# Patient Record
Sex: FEMALE | Race: WHITE | NOT HISPANIC OR LATINO | Employment: FULL TIME | URBAN - METROPOLITAN AREA
[De-identification: names, ages, dates, MRNs, and addresses within clinical notes are randomized per-mention and may not be internally consistent; named-entity substitution may affect disease eponyms.]

---

## 2019-02-19 ENCOUNTER — APPOINTMENT (OUTPATIENT)
Dept: PREADMISSION TESTING | Facility: HOSPITAL | Age: 49
End: 2019-02-19

## 2019-02-19 ENCOUNTER — HOSPITAL ENCOUNTER (OUTPATIENT)
Dept: GENERAL RADIOLOGY | Facility: HOSPITAL | Age: 49
Discharge: HOME | End: 2019-02-19
Admitting: ORTHOPAEDIC SURGERY

## 2019-02-19 VITALS
WEIGHT: 292.4 LBS | HEART RATE: 94 BPM | TEMPERATURE: 97.3 F | OXYGEN SATURATION: 97 % | BODY MASS INDEX: 39.6 KG/M2 | DIASTOLIC BLOOD PRESSURE: 90 MMHG | RESPIRATION RATE: 16 BRPM | HEIGHT: 72 IN | SYSTOLIC BLOOD PRESSURE: 142 MMHG

## 2019-02-19 DIAGNOSIS — M17.9 OSTEOARTHRITIS OF KNEE, UNSPECIFIED LATERALITY, UNSPECIFIED OSTEOARTHRITIS TYPE: ICD-10-CM

## 2019-02-19 LAB
ABO GROUP BLD: NORMAL
ANION GAP SERPL CALCULATED.3IONS-SCNC: 10.5 MMOL/L
BILIRUB UR QL STRIP: NEGATIVE
BLD GP AB SCN SERPL QL: NEGATIVE
BUN BLD-MCNC: 11 MG/DL (ref 6–20)
BUN/CREAT SERPL: 17.5 (ref 7–25)
CALCIUM SPEC-SCNC: 8.7 MG/DL (ref 8.6–10.5)
CHLORIDE SERPL-SCNC: 105 MMOL/L (ref 98–107)
CLARITY UR: CLEAR
CO2 SERPL-SCNC: 23.5 MMOL/L (ref 22–29)
COLOR UR: YELLOW
CREAT BLD-MCNC: 0.63 MG/DL (ref 0.57–1)
DEPRECATED RDW RBC AUTO: 44.6 FL (ref 37–54)
ERYTHROCYTE [DISTWIDTH] IN BLOOD BY AUTOMATED COUNT: 13.6 % (ref 12.3–15.4)
GFR SERPL CREATININE-BSD FRML MDRD: 101 ML/MIN/1.73
GLUCOSE BLD-MCNC: 93 MG/DL (ref 65–99)
GLUCOSE UR STRIP-MCNC: NEGATIVE MG/DL
HCT VFR BLD AUTO: 42.2 % (ref 34–46.6)
HGB BLD-MCNC: 14 G/DL (ref 12–15.9)
HGB UR QL STRIP.AUTO: NEGATIVE
INR PPP: 1.02 (ref 0.9–1.1)
KETONES UR QL STRIP: ABNORMAL
LEUKOCYTE ESTERASE UR QL STRIP.AUTO: NEGATIVE
MCH RBC QN AUTO: 29.7 PG (ref 26.6–33)
MCHC RBC AUTO-ENTMCNC: 33.2 G/DL (ref 31.5–35.7)
MCV RBC AUTO: 89.4 FL (ref 79–97)
NITRITE UR QL STRIP: NEGATIVE
PH UR STRIP.AUTO: 7 [PH] (ref 5–8)
PLATELET # BLD AUTO: 315 10*3/MM3 (ref 140–450)
PMV BLD AUTO: 10 FL (ref 6–12)
POTASSIUM BLD-SCNC: 4 MMOL/L (ref 3.5–5.2)
PROT UR QL STRIP: NEGATIVE
PROTHROMBIN TIME: 13.2 SECONDS (ref 11.7–14.2)
RBC # BLD AUTO: 4.72 10*6/MM3 (ref 3.77–5.28)
RH BLD: NEGATIVE
SODIUM BLD-SCNC: 139 MMOL/L (ref 136–145)
SP GR UR STRIP: 1.02 (ref 1–1.03)
T&S EXPIRATION DATE: NORMAL
UROBILINOGEN UR QL STRIP: ABNORMAL
WBC NRBC COR # BLD: 9.25 10*3/MM3 (ref 3.4–10.8)

## 2019-02-19 PROCEDURE — 85610 PROTHROMBIN TIME: CPT | Performed by: ORTHOPAEDIC SURGERY

## 2019-02-19 PROCEDURE — 85027 COMPLETE CBC AUTOMATED: CPT | Performed by: ORTHOPAEDIC SURGERY

## 2019-02-19 PROCEDURE — 36415 COLL VENOUS BLD VENIPUNCTURE: CPT

## 2019-02-19 PROCEDURE — 86850 RBC ANTIBODY SCREEN: CPT | Performed by: ORTHOPAEDIC SURGERY

## 2019-02-19 PROCEDURE — 93010 ELECTROCARDIOGRAM REPORT: CPT | Performed by: INTERNAL MEDICINE

## 2019-02-19 PROCEDURE — 93005 ELECTROCARDIOGRAM TRACING: CPT

## 2019-02-19 PROCEDURE — 80048 BASIC METABOLIC PNL TOTAL CA: CPT | Performed by: ORTHOPAEDIC SURGERY

## 2019-02-19 PROCEDURE — 71046 X-RAY EXAM CHEST 2 VIEWS: CPT

## 2019-02-19 PROCEDURE — 86901 BLOOD TYPING SEROLOGIC RH(D): CPT | Performed by: ORTHOPAEDIC SURGERY

## 2019-02-19 PROCEDURE — 86900 BLOOD TYPING SEROLOGIC ABO: CPT | Performed by: ORTHOPAEDIC SURGERY

## 2019-02-19 PROCEDURE — 81003 URINALYSIS AUTO W/O SCOPE: CPT | Performed by: ORTHOPAEDIC SURGERY

## 2019-02-19 RX ORDER — CHLORHEXIDINE GLUCONATE 500 MG/1
1 CLOTH TOPICAL 2 TIMES DAILY
COMMUNITY
Start: 2019-03-04 | End: 2019-03-06 | Stop reason: HOSPADM

## 2019-02-19 RX ORDER — CETIRIZINE HYDROCHLORIDE 10 MG/1
10 TABLET ORAL DAILY PRN
COMMUNITY

## 2019-02-19 ASSESSMENT — KOOS JR
KOOS JR SCORE: 44.905
KOOS JR SCORE: 17

## 2019-02-19 NOTE — DISCHARGE INSTRUCTIONS
Take the following medications the morning of surgery with a small sip of water:  NONE    ARRIVE AT 0530.        General Instructions:  • Do not eat solid food after midnight the night before surgery.  • You may drink clear liquids day of surgery but must stop at least one hour before your hospital arrival time.  • It is beneficial for you to have a clear drink that contains carbohydrates the day of surgery.  We suggest a 12 to 20 ounce bottle of Gatorade or Powerade for non-diabetic patients or a 12 to 20 ounce bottle of G2 or Powerade Zero for diabetic patients. (Pediatric patients, are not advised to drink a 12 to 20 ounce carbohydrate drink)    Clear liquids are liquids you can see through.  Nothing red in color.     Plain water                               Sports drinks  Sodas                                   Gelatin (Jell-O)  Fruit juices without pulp such as white grape juice and apple juice  Popsicles that contain no fruit or yogurt  Tea or coffee (no cream or milk added)  Gatorade / Powerade  G2 / Powerade Zero    • Infants may have breast milk up to four hours before surgery.  • Infants drinking formula may drink formula up to six hours before surgery.   • Patients who avoid smoking, chewing tobacco and alcohol for 4 weeks prior to surgery have a reduced risk of post-operative complications.  Quit smoking as many days before surgery as you can.  • Do not smoke, use chewing tobacco or drink alcohol the day of surgery.   • If applicable bring your C-PAP/ BI-PAP machine.  • Bring any papers given to you in the doctor’s office.  • Wear clean comfortable clothes and socks.  • Do not wear contact lenses or make-up.  Bring a case for your glasses.   • Bring crutches or walker if applicable.  • Remove all piercings.  Leave jewelry and any other valuables at home.  • Hair extensions with metal clips must be removed prior to surgery.  • The Pre-Admission Testing nurse will instruct you to bring medications if  unable to obtain an accurate list in Pre-Admission Testing.        If you were given a blood bank ID arm band remember to bring it with you the day of surgery.    Preventing a Surgical Site Infection:  • For 2 to 3 days before surgery, avoid shaving with a razor because the razor can irritate skin and make it easier to develop an infection.    • Any areas of open skin can increase the risk of a post-operative wound infection by allowing bacteria to enter and travel throughout the body.  Notify your surgeon if you have any skin wounds / rashes even if it is not near the expected surgical site.  The area will need assessed to determine if surgery should be delayed until it is healed.  • The night prior to surgery sleep in a clean bed with clean clothing.  Do not allow pets to sleep with you.  • Shower on the morning of surgery using a fresh bar of anti-bacterial soap (such as Dial) and clean washcloth.  Dry with a clean towel and dress in clean clothing.  • Ask your surgeon if you will be receiving antibiotics prior to surgery.  • Make sure you, your family, and all healthcare providers clean their hands with soap and water or an alcohol based hand  before caring for you or your wound.    Day of surgery:  Upon arrival, a Pre-op nurse and Anesthesiologist will review your health history, obtain vital signs, and answer questions you may have.  The only belongings needed at this time will be your home medications and if applicable your C-PAP/BI-PAP machine.  If you are staying overnight your family can leave the rest of your belongings in the car and bring them to your room later.  A Pre-op nurse will start an IV and you may receive medication in preparation for surgery, including something to help you relax.  Your family will be able to see you in the Pre-op area.  While you are in surgery your family should notify the waiting room  if they leave the waiting room area and provide a contact phone  number.    Please be aware that surgery does come with discomfort.  We want to make every effort to control your discomfort so please discuss any uncontrolled symptoms with your nurse.   Your doctor will most likely have prescribed pain medications.      If you are going home after surgery you will receive individualized written care instructions before being discharged.  A responsible adult must drive you to and from the hospital on the day of your surgery and stay with you for 24 hours.    If you are staying overnight following surgery, you will be transported to your hospital room following the recovery period.  [facility] has all private rooms.    You have received a list of surgical assistants for your reference.  If you have any questions please call Pre-Admission Testing at [phone].  Deductibles and co-payments are collected on the day of service. Please be prepared to pay the required co-pay, deductible or deposit on the day of service as defined by your plan.    2% CHLORAHEXIDINE GLUCONATE* CLOTH  Preparing or “prepping” skin before surgery can reduce the risk of infection at the surgical site. To make the process easier, [facility] has chosen disposable cloths moistened with a rinse-free, 2% Chlorhexidine Gluconate (CHG) antiseptic solution. The steps below outline the prepping process and should be carefully followed.        Use the prep cloth on the area that is circled in the diagram             Directions Night before Surgery  1) Shower using a fresh bar of anti-bacterial soap (such as Dial) and clean washcloth.  Use a clean towel to completely dry your skin.  2) Do not use any lotions, oils or creams on your skin.  3) Open the package and remove 1 cloth, wipe your skin for 30 seconds in a circular motion.  Allow to dry for 3 minutes.  4) Repeat #3 with second cloth.  5) Do not touch your eyes, ears, or mouth with the prep cloth.  6) Allow the wet prep solution to air  dry.  7) Discard the prep cloth and wash your hands with soap and water.   8) Dress in clean bed clothes and sleep on fresh clean bed sheets.   9) You may experience some temporary itching after the prep.    Directions Day of Surgery  1) Repeat steps 1,2,3,4,5,6,7, and 9.   2) Dress in clean clothes before coming to the hospital.    BACTROBAN NASAL OINTMENT  There are many germs normally in your nose. Bactroban is an ointment that will help reduce these germs. Please follow these instructions for Bactroban use:      ____The day before surgery in the morning  Date________    ____The day before surgery in the evening              Date________    ____The day of surgery in the morning    Date________    **Squirt ½ package of Bactroban Ointment onto a cotton applicator and apply to inside of 1st nostril.  Squirt the remaining Bactroban and apply to the inside of the other nostril.

## 2019-03-05 ENCOUNTER — HOSPITAL ENCOUNTER (INPATIENT)
Facility: HOSPITAL | Age: 49
LOS: 1 days | Discharge: HOME | End: 2019-03-06
Attending: ORTHOPAEDIC SURGERY | Admitting: ORTHOPAEDIC SURGERY

## 2019-03-05 ENCOUNTER — APPOINTMENT (OUTPATIENT)
Dept: GENERAL RADIOLOGY | Facility: HOSPITAL | Age: 49
End: 2019-03-05

## 2019-03-05 ENCOUNTER — ANESTHESIA (OUTPATIENT)
Dept: PERIOP | Facility: HOSPITAL | Age: 49
End: 2019-03-05

## 2019-03-05 ENCOUNTER — ANESTHESIA EVENT (OUTPATIENT)
Dept: PERIOP | Facility: HOSPITAL | Age: 49
End: 2019-03-05

## 2019-03-05 DIAGNOSIS — R26.2 DIFFICULTY WALKING: Primary | ICD-10-CM

## 2019-03-05 LAB
B-HCG UR QL: NEGATIVE
HCT VFR BLD AUTO: 43.8 % (ref 34–46.6)
HGB BLD-MCNC: 14.3 G/DL (ref 12–15.9)
INTERNAL NEGATIVE CONTROL: NEGATIVE
INTERNAL POSITIVE CONTROL: POSITIVE
Lab: NORMAL

## 2019-03-05 PROCEDURE — 25010000002 FENTANYL CITRATE (PF) 100 MCG/2ML SOLUTION: Performed by: NURSE ANESTHETIST, CERTIFIED REGISTERED

## 2019-03-05 PROCEDURE — 0SRC0J9 REPLACEMENT OF RIGHT KNEE JOINT WITH SYNTHETIC SUBSTITUTE, CEMENTED, OPEN APPROACH: ICD-10-PCS | Performed by: ORTHOPAEDIC SURGERY

## 2019-03-05 PROCEDURE — C1713 ANCHOR/SCREW BN/BN,TIS/BN: HCPCS | Performed by: ORTHOPAEDIC SURGERY

## 2019-03-05 PROCEDURE — 25010000002 KETOROLAC TROMETHAMINE PER 15 MG: Performed by: ORTHOPAEDIC SURGERY

## 2019-03-05 PROCEDURE — 25010000002 HYDROMORPHONE PER 4 MG: Performed by: NURSE ANESTHETIST, CERTIFIED REGISTERED

## 2019-03-05 PROCEDURE — C1776 JOINT DEVICE (IMPLANTABLE): HCPCS | Performed by: ORTHOPAEDIC SURGERY

## 2019-03-05 PROCEDURE — 25010000002 PROPOFOL 10 MG/ML EMULSION: Performed by: NURSE ANESTHETIST, CERTIFIED REGISTERED

## 2019-03-05 PROCEDURE — 25010000002 VANCOMYCIN 10 G RECONSTITUTED SOLUTION: Performed by: ORTHOPAEDIC SURGERY

## 2019-03-05 PROCEDURE — 25010000002 ROPIVACAINE PER 1 MG: Performed by: ORTHOPAEDIC SURGERY

## 2019-03-05 PROCEDURE — 25010000002 MIDAZOLAM PER 1 MG: Performed by: ANESTHESIOLOGY

## 2019-03-05 PROCEDURE — 85018 HEMOGLOBIN: CPT | Performed by: ORTHOPAEDIC SURGERY

## 2019-03-05 PROCEDURE — 25010000002 HYDROMORPHONE PER 4 MG: Performed by: ANESTHESIOLOGY

## 2019-03-05 PROCEDURE — 73560 X-RAY EXAM OF KNEE 1 OR 2: CPT

## 2019-03-05 PROCEDURE — 25010000002 FENTANYL CITRATE (PF) 100 MCG/2ML SOLUTION: Performed by: ANESTHESIOLOGY

## 2019-03-05 PROCEDURE — 81025 URINE PREGNANCY TEST: CPT | Performed by: ANESTHESIOLOGY

## 2019-03-05 PROCEDURE — 25010000002 DEXAMETHASONE PER 1 MG: Performed by: NURSE ANESTHETIST, CERTIFIED REGISTERED

## 2019-03-05 PROCEDURE — 25010000002 CLONIDINE PER 1 MG: Performed by: ORTHOPAEDIC SURGERY

## 2019-03-05 PROCEDURE — 25010000002 VANCOMYCIN 5 G RECONSTITUTED SOLUTION: Performed by: ORTHOPAEDIC SURGERY

## 2019-03-05 PROCEDURE — 85014 HEMATOCRIT: CPT | Performed by: ORTHOPAEDIC SURGERY

## 2019-03-05 PROCEDURE — 25010000002 ONDANSETRON PER 1 MG: Performed by: NURSE ANESTHETIST, CERTIFIED REGISTERED

## 2019-03-05 DEVICE — CAP EXT STEM KN UPCHRG: Type: IMPLANTABLE DEVICE | Site: KNEE | Status: FUNCTIONAL

## 2019-03-05 DEVICE — ART/SRF KN PERSONA/VE MC EF 8TO11 10MM RT: Type: IMPLANTABLE DEVICE | Site: KNEE | Status: FUNCTIONAL

## 2019-03-05 DEVICE — CMT BONE PALACOS R HI/VISC 1X40: Type: IMPLANTABLE DEVICE | Site: KNEE | Status: FUNCTIONAL

## 2019-03-05 DEVICE — CAP TOTL KN CMT PREMIUM: Type: IMPLANTABLE DEVICE | Site: KNEE | Status: FUNCTIONAL

## 2019-03-05 DEVICE — CAP KNEE TIB MC UPCHRG: Type: IMPLANTABLE DEVICE | Site: KNEE | Status: FUNCTIONAL

## 2019-03-05 DEVICE — STEM TIB/KN PERSONA CMT 5D SZE RT: Type: IMPLANTABLE DEVICE | Site: KNEE | Status: FUNCTIONAL

## 2019-03-05 DEVICE — EXT STEM FEM/KN PERSONA TPR 14XPLS30MM: Type: IMPLANTABLE DEVICE | Site: KNEE | Status: FUNCTIONAL

## 2019-03-05 DEVICE — COMP FEM PERSONA CR CMT NRW SZ10 RT: Type: IMPLANTABLE DEVICE | Site: KNEE | Status: FUNCTIONAL

## 2019-03-05 RX ORDER — ACETAMINOPHEN 325 MG/1
650 TABLET ORAL ONCE AS NEEDED
Status: DISCONTINUED | OUTPATIENT
Start: 2019-03-05 | End: 2019-03-05 | Stop reason: HOSPADM

## 2019-03-05 RX ORDER — FENTANYL CITRATE 50 UG/ML
50 INJECTION, SOLUTION INTRAMUSCULAR; INTRAVENOUS
Status: DISCONTINUED | OUTPATIENT
Start: 2019-03-05 | End: 2019-03-05 | Stop reason: HOSPADM

## 2019-03-05 RX ORDER — TRANEXAMIC ACID 100 MG/ML
INJECTION, SOLUTION INTRAVENOUS AS NEEDED
Status: DISCONTINUED | OUTPATIENT
Start: 2019-03-05 | End: 2019-03-05 | Stop reason: SURG

## 2019-03-05 RX ORDER — ROCURONIUM BROMIDE 10 MG/ML
INJECTION, SOLUTION INTRAVENOUS AS NEEDED
Status: DISCONTINUED | OUTPATIENT
Start: 2019-03-05 | End: 2019-03-05 | Stop reason: SURG

## 2019-03-05 RX ORDER — HYDROMORPHONE HYDROCHLORIDE 1 MG/ML
0.5 INJECTION, SOLUTION INTRAMUSCULAR; INTRAVENOUS; SUBCUTANEOUS
Status: DISCONTINUED | OUTPATIENT
Start: 2019-03-05 | End: 2019-03-05 | Stop reason: HOSPADM

## 2019-03-05 RX ORDER — PROPOFOL 10 MG/ML
VIAL (ML) INTRAVENOUS AS NEEDED
Status: DISCONTINUED | OUTPATIENT
Start: 2019-03-05 | End: 2019-03-05 | Stop reason: SURG

## 2019-03-05 RX ORDER — EPHEDRINE SULFATE 50 MG/ML
5 INJECTION, SOLUTION INTRAVENOUS ONCE AS NEEDED
Status: DISCONTINUED | OUTPATIENT
Start: 2019-03-05 | End: 2019-03-05 | Stop reason: HOSPADM

## 2019-03-05 RX ORDER — ONDANSETRON 2 MG/ML
INJECTION INTRAMUSCULAR; INTRAVENOUS AS NEEDED
Status: DISCONTINUED | OUTPATIENT
Start: 2019-03-05 | End: 2019-03-05 | Stop reason: SURG

## 2019-03-05 RX ORDER — FAMOTIDINE 10 MG/ML
20 INJECTION, SOLUTION INTRAVENOUS ONCE
Status: DISCONTINUED | OUTPATIENT
Start: 2019-03-05 | End: 2019-03-05 | Stop reason: HOSPADM

## 2019-03-05 RX ORDER — HYDRALAZINE HYDROCHLORIDE 20 MG/ML
5 INJECTION INTRAMUSCULAR; INTRAVENOUS
Status: DISCONTINUED | OUTPATIENT
Start: 2019-03-05 | End: 2019-03-05 | Stop reason: HOSPADM

## 2019-03-05 RX ORDER — ONDANSETRON 2 MG/ML
4 INJECTION INTRAMUSCULAR; INTRAVENOUS ONCE AS NEEDED
Status: DISCONTINUED | OUTPATIENT
Start: 2019-03-05 | End: 2019-03-05 | Stop reason: HOSPADM

## 2019-03-05 RX ORDER — HYDROCODONE BITARTRATE AND ACETAMINOPHEN 7.5; 325 MG/1; MG/1
1 TABLET ORAL EVERY 4 HOURS PRN
Status: DISCONTINUED | OUTPATIENT
Start: 2019-03-05 | End: 2019-03-06 | Stop reason: HOSPADM

## 2019-03-05 RX ORDER — OXYCODONE AND ACETAMINOPHEN 7.5; 325 MG/1; MG/1
1 TABLET ORAL ONCE AS NEEDED
Status: DISCONTINUED | OUTPATIENT
Start: 2019-03-05 | End: 2019-03-05 | Stop reason: HOSPADM

## 2019-03-05 RX ORDER — ASPIRIN 325 MG
325 TABLET, DELAYED RELEASE (ENTERIC COATED) ORAL EVERY 12 HOURS SCHEDULED
Status: DISCONTINUED | OUTPATIENT
Start: 2019-03-05 | End: 2019-03-06 | Stop reason: HOSPADM

## 2019-03-05 RX ORDER — LABETALOL HYDROCHLORIDE 5 MG/ML
5 INJECTION, SOLUTION INTRAVENOUS
Status: DISCONTINUED | OUTPATIENT
Start: 2019-03-05 | End: 2019-03-05 | Stop reason: HOSPADM

## 2019-03-05 RX ORDER — LIDOCAINE HYDROCHLORIDE 10 MG/ML
0.5 INJECTION, SOLUTION EPIDURAL; INFILTRATION; INTRACAUDAL; PERINEURAL ONCE AS NEEDED
Status: DISCONTINUED | OUTPATIENT
Start: 2019-03-05 | End: 2019-03-05 | Stop reason: HOSPADM

## 2019-03-05 RX ORDER — SODIUM CHLORIDE, SODIUM LACTATE, POTASSIUM CHLORIDE, CALCIUM CHLORIDE 600; 310; 30; 20 MG/100ML; MG/100ML; MG/100ML; MG/100ML
9 INJECTION, SOLUTION INTRAVENOUS CONTINUOUS
Status: DISCONTINUED | OUTPATIENT
Start: 2019-03-05 | End: 2019-03-06 | Stop reason: HOSPADM

## 2019-03-05 RX ORDER — DEXAMETHASONE SODIUM PHOSPHATE 10 MG/ML
INJECTION INTRAMUSCULAR; INTRAVENOUS AS NEEDED
Status: DISCONTINUED | OUTPATIENT
Start: 2019-03-05 | End: 2019-03-05 | Stop reason: SURG

## 2019-03-05 RX ORDER — HYDROCODONE BITARTRATE AND ACETAMINOPHEN 7.5; 325 MG/1; MG/1
2 TABLET ORAL EVERY 4 HOURS PRN
Status: DISCONTINUED | OUTPATIENT
Start: 2019-03-05 | End: 2019-03-06 | Stop reason: HOSPADM

## 2019-03-05 RX ORDER — MIDAZOLAM HYDROCHLORIDE 1 MG/ML
2 INJECTION INTRAMUSCULAR; INTRAVENOUS
Status: DISCONTINUED | OUTPATIENT
Start: 2019-03-05 | End: 2019-03-05 | Stop reason: HOSPADM

## 2019-03-05 RX ORDER — ONDANSETRON 4 MG/1
4 TABLET, ORALLY DISINTEGRATING ORAL EVERY 6 HOURS PRN
Status: DISCONTINUED | OUTPATIENT
Start: 2019-03-05 | End: 2019-03-06 | Stop reason: HOSPADM

## 2019-03-05 RX ORDER — ACETAMINOPHEN 325 MG/1
325 TABLET ORAL EVERY 4 HOURS PRN
Status: DISCONTINUED | OUTPATIENT
Start: 2019-03-05 | End: 2019-03-06 | Stop reason: HOSPADM

## 2019-03-05 RX ORDER — DOCUSATE SODIUM 100 MG/1
100 CAPSULE, LIQUID FILLED ORAL 2 TIMES DAILY PRN
Status: DISCONTINUED | OUTPATIENT
Start: 2019-03-05 | End: 2019-03-06 | Stop reason: HOSPADM

## 2019-03-05 RX ORDER — LIDOCAINE HYDROCHLORIDE 20 MG/ML
INJECTION, SOLUTION INFILTRATION; PERINEURAL AS NEEDED
Status: DISCONTINUED | OUTPATIENT
Start: 2019-03-05 | End: 2019-03-05 | Stop reason: SURG

## 2019-03-05 RX ORDER — FLUMAZENIL 0.1 MG/ML
0.2 INJECTION INTRAVENOUS AS NEEDED
Status: DISCONTINUED | OUTPATIENT
Start: 2019-03-05 | End: 2019-03-05 | Stop reason: HOSPADM

## 2019-03-05 RX ORDER — MIDAZOLAM HYDROCHLORIDE 1 MG/ML
1 INJECTION INTRAMUSCULAR; INTRAVENOUS
Status: DISCONTINUED | OUTPATIENT
Start: 2019-03-05 | End: 2019-03-05 | Stop reason: HOSPADM

## 2019-03-05 RX ORDER — BUPIVACAINE HYDROCHLORIDE AND EPINEPHRINE 5; 5 MG/ML; UG/ML
INJECTION, SOLUTION EPIDURAL; INTRACAUDAL; PERINEURAL
Status: COMPLETED | OUTPATIENT
Start: 2019-03-05 | End: 2019-03-05

## 2019-03-05 RX ORDER — PROMETHAZINE HYDROCHLORIDE 25 MG/ML
12.5 INJECTION, SOLUTION INTRAMUSCULAR; INTRAVENOUS ONCE AS NEEDED
Status: DISCONTINUED | OUTPATIENT
Start: 2019-03-05 | End: 2019-03-05 | Stop reason: HOSPADM

## 2019-03-05 RX ORDER — HYDROMORPHONE HYDROCHLORIDE 1 MG/ML
0.5 INJECTION, SOLUTION INTRAMUSCULAR; INTRAVENOUS; SUBCUTANEOUS
Status: DISCONTINUED | OUTPATIENT
Start: 2019-03-05 | End: 2019-03-06 | Stop reason: HOSPADM

## 2019-03-05 RX ORDER — PROMETHAZINE HYDROCHLORIDE 25 MG/1
25 SUPPOSITORY RECTAL ONCE AS NEEDED
Status: DISCONTINUED | OUTPATIENT
Start: 2019-03-05 | End: 2019-03-05 | Stop reason: HOSPADM

## 2019-03-05 RX ORDER — BISACODYL 5 MG/1
10 TABLET, DELAYED RELEASE ORAL DAILY PRN
Status: DISCONTINUED | OUTPATIENT
Start: 2019-03-05 | End: 2019-03-06 | Stop reason: HOSPADM

## 2019-03-05 RX ORDER — HYDROCODONE BITARTRATE AND ACETAMINOPHEN 7.5; 325 MG/1; MG/1
1 TABLET ORAL ONCE AS NEEDED
Status: DISCONTINUED | OUTPATIENT
Start: 2019-03-05 | End: 2019-03-05 | Stop reason: HOSPADM

## 2019-03-05 RX ORDER — HYDROMORPHONE HCL 110MG/55ML
PATIENT CONTROLLED ANALGESIA SYRINGE INTRAVENOUS AS NEEDED
Status: DISCONTINUED | OUTPATIENT
Start: 2019-03-05 | End: 2019-03-05 | Stop reason: SURG

## 2019-03-05 RX ORDER — DIPHENHYDRAMINE HYDROCHLORIDE 50 MG/ML
12.5 INJECTION INTRAMUSCULAR; INTRAVENOUS
Status: DISCONTINUED | OUTPATIENT
Start: 2019-03-05 | End: 2019-03-05 | Stop reason: HOSPADM

## 2019-03-05 RX ORDER — DIPHENHYDRAMINE HCL 25 MG
25 CAPSULE ORAL
Status: DISCONTINUED | OUTPATIENT
Start: 2019-03-05 | End: 2019-03-05 | Stop reason: HOSPADM

## 2019-03-05 RX ORDER — SODIUM CHLORIDE 0.9 % (FLUSH) 0.9 %
3-10 SYRINGE (ML) INJECTION AS NEEDED
Status: DISCONTINUED | OUTPATIENT
Start: 2019-03-05 | End: 2019-03-06 | Stop reason: HOSPADM

## 2019-03-05 RX ORDER — SODIUM CHLORIDE 0.9 % (FLUSH) 0.9 %
3 SYRINGE (ML) INJECTION EVERY 12 HOURS SCHEDULED
Status: DISCONTINUED | OUTPATIENT
Start: 2019-03-05 | End: 2019-03-06 | Stop reason: HOSPADM

## 2019-03-05 RX ORDER — NALOXONE HCL 0.4 MG/ML
0.2 VIAL (ML) INJECTION AS NEEDED
Status: DISCONTINUED | OUTPATIENT
Start: 2019-03-05 | End: 2019-03-05 | Stop reason: HOSPADM

## 2019-03-05 RX ORDER — SODIUM CHLORIDE, SODIUM LACTATE, POTASSIUM CHLORIDE, CALCIUM CHLORIDE 600; 310; 30; 20 MG/100ML; MG/100ML; MG/100ML; MG/100ML
100 INJECTION, SOLUTION INTRAVENOUS CONTINUOUS
Status: DISCONTINUED | OUTPATIENT
Start: 2019-03-05 | End: 2019-03-06 | Stop reason: HOSPADM

## 2019-03-05 RX ORDER — ONDANSETRON 4 MG/1
4 TABLET, FILM COATED ORAL EVERY 6 HOURS PRN
Status: DISCONTINUED | OUTPATIENT
Start: 2019-03-05 | End: 2019-03-06 | Stop reason: HOSPADM

## 2019-03-05 RX ORDER — KETOROLAC TROMETHAMINE 30 MG/ML
15 INJECTION, SOLUTION INTRAMUSCULAR; INTRAVENOUS EVERY 6 HOURS PRN
Status: DISCONTINUED | OUTPATIENT
Start: 2019-03-05 | End: 2019-03-06 | Stop reason: HOSPADM

## 2019-03-05 RX ORDER — ONDANSETRON 2 MG/ML
4 INJECTION INTRAMUSCULAR; INTRAVENOUS EVERY 6 HOURS PRN
Status: DISCONTINUED | OUTPATIENT
Start: 2019-03-05 | End: 2019-03-06 | Stop reason: HOSPADM

## 2019-03-05 RX ORDER — DEXAMETHASONE SODIUM PHOSPHATE 4 MG/ML
4 INJECTION, SOLUTION INTRA-ARTICULAR; INTRALESIONAL; INTRAMUSCULAR; INTRAVENOUS; SOFT TISSUE ONCE
Status: COMPLETED | OUTPATIENT
Start: 2019-03-06 | End: 2019-03-06

## 2019-03-05 RX ORDER — PROMETHAZINE HYDROCHLORIDE 25 MG/1
25 TABLET ORAL ONCE AS NEEDED
Status: DISCONTINUED | OUTPATIENT
Start: 2019-03-05 | End: 2019-03-05 | Stop reason: HOSPADM

## 2019-03-05 RX ORDER — SODIUM CHLORIDE 0.9 % (FLUSH) 0.9 %
1-10 SYRINGE (ML) INJECTION AS NEEDED
Status: DISCONTINUED | OUTPATIENT
Start: 2019-03-05 | End: 2019-03-05 | Stop reason: HOSPADM

## 2019-03-05 RX ORDER — NALOXONE HCL 0.4 MG/ML
0.1 VIAL (ML) INJECTION
Status: DISCONTINUED | OUTPATIENT
Start: 2019-03-05 | End: 2019-03-06 | Stop reason: HOSPADM

## 2019-03-05 RX ADMIN — FENTANYL CITRATE 50 MCG: 50 INJECTION, SOLUTION INTRAMUSCULAR; INTRAVENOUS at 16:46

## 2019-03-05 RX ADMIN — ROCURONIUM BROMIDE 50 MG: 10 INJECTION INTRAVENOUS at 13:36

## 2019-03-05 RX ADMIN — FENTANYL CITRATE 50 MCG: 50 INJECTION, SOLUTION INTRAMUSCULAR; INTRAVENOUS at 17:02

## 2019-03-05 RX ADMIN — SODIUM CHLORIDE, POTASSIUM CHLORIDE, SODIUM LACTATE AND CALCIUM CHLORIDE 9 ML/HR: 600; 310; 30; 20 INJECTION, SOLUTION INTRAVENOUS at 11:08

## 2019-03-05 RX ADMIN — HYDROMORPHONE HYDROCHLORIDE 0.5 MG: 1 INJECTION, SOLUTION INTRAMUSCULAR; INTRAVENOUS; SUBCUTANEOUS at 16:38

## 2019-03-05 RX ADMIN — DEXAMETHASONE SODIUM PHOSPHATE 8 MG: 10 INJECTION INTRAMUSCULAR; INTRAVENOUS at 14:02

## 2019-03-05 RX ADMIN — KETOROLAC TROMETHAMINE 15 MG: 30 INJECTION, SOLUTION INTRAMUSCULAR at 16:40

## 2019-03-05 RX ADMIN — TRANEXAMIC ACID 1000 MG: 100 INJECTION, SOLUTION INTRAVENOUS at 13:47

## 2019-03-05 RX ADMIN — HYDROCODONE BITARTRATE AND ACETAMINOPHEN 1 TABLET: 7.5; 325 TABLET ORAL at 19:55

## 2019-03-05 RX ADMIN — FENTANYL CITRATE 50 MCG: 50 INJECTION, SOLUTION INTRAMUSCULAR; INTRAVENOUS at 13:36

## 2019-03-05 RX ADMIN — PROPOFOL 100 MG: 10 INJECTION, EMULSION INTRAVENOUS at 13:39

## 2019-03-05 RX ADMIN — PROPOFOL 200 MG: 10 INJECTION, EMULSION INTRAVENOUS at 13:36

## 2019-03-05 RX ADMIN — FENTANYL CITRATE 50 MCG: 50 INJECTION, SOLUTION INTRAMUSCULAR; INTRAVENOUS at 11:07

## 2019-03-05 RX ADMIN — FENTANYL CITRATE 100 MCG: 50 INJECTION, SOLUTION INTRAMUSCULAR; INTRAVENOUS at 13:40

## 2019-03-05 RX ADMIN — MIDAZOLAM 2 MG: 1 INJECTION INTRAMUSCULAR; INTRAVENOUS at 11:06

## 2019-03-05 RX ADMIN — ASPIRIN 325 MG: 325 TABLET, DELAYED RELEASE ORAL at 22:30

## 2019-03-05 RX ADMIN — TRANEXAMIC ACID 1000 MG: 100 INJECTION, SOLUTION INTRAVENOUS at 15:39

## 2019-03-05 RX ADMIN — LIDOCAINE HYDROCHLORIDE 40 MG: 20 INJECTION, SOLUTION INFILTRATION; PERINEURAL at 13:36

## 2019-03-05 RX ADMIN — BUPIVACAINE HYDROCHLORIDE AND EPINEPHRINE BITARTRATE 30 ML: 5; .0091 INJECTION, SOLUTION EPIDURAL; INTRACAUDAL; PERINEURAL at 11:16

## 2019-03-05 RX ADMIN — FENTANYL CITRATE 100 MCG: 50 INJECTION, SOLUTION INTRAMUSCULAR; INTRAVENOUS at 14:04

## 2019-03-05 RX ADMIN — HYDROMORPHONE HYDROCHLORIDE 1 MG: 2 INJECTION INTRAMUSCULAR; INTRAVENOUS; SUBCUTANEOUS at 15:28

## 2019-03-05 RX ADMIN — VANCOMYCIN HYDROCHLORIDE 2000 MG: 5 INJECTION, POWDER, LYOPHILIZED, FOR SOLUTION INTRAVENOUS at 22:30

## 2019-03-05 RX ADMIN — VANCOMYCIN HYDROCHLORIDE 2000 MG: 10 INJECTION, POWDER, LYOPHILIZED, FOR SOLUTION INTRAVENOUS at 10:55

## 2019-03-05 RX ADMIN — ONDANSETRON 4 MG: 2 INJECTION INTRAMUSCULAR; INTRAVENOUS at 13:33

## 2019-03-05 NOTE — ANESTHESIA PROCEDURE NOTES
Airway  Urgency: elective    Airway not difficult (but stiff )    General Information and Staff    Patient location during procedure: OR  Anesthesiologist: Hayden Brooks MD  CRNA: Jessica Delgado CRNA    Indications and Patient Condition  Indications for airway management: airway protection    Preoxygenated: yes  Mask difficulty assessment: 1 - vent by mask    Final Airway Details  Final airway type: endotracheal airway      Successful airway: ETT  Cuffed: yes   Successful intubation technique: direct laryngoscopy  Facilitating devices/methods: intubating stylet  Endotracheal tube insertion site: oral  Blade: Sanches  Blade size: 2  ETT size (mm): 7.0  Cormack-Lehane Classification: grade IIa - partial view of glottis  Placement verified by: chest auscultation and capnometry   Cuff volume (mL): 7  Measured from: teeth  ETT to teeth (cm): 20  Number of attempts at approach: 1

## 2019-03-05 NOTE — PLAN OF CARE
Problem: Patient Care Overview  Goal: Plan of Care Review  Outcome: Ongoing (interventions implemented as appropriate)   03/05/19 0183   Coping/Psychosocial   Plan of Care Reviewed With patient   Plan of Care Review   Progress improving   OTHER   Outcome Summary S/P RTKA. Arrived from PACU at 1820. VSS. Dressing c/d/i. DTV 0020. Neurovascular WNL. No comorbidities to discuss. Plans to d/c home with HH.      Goal: Individualization and Mutuality  Outcome: Ongoing (interventions implemented as appropriate)      Problem: Knee Arthroplasty (Total, Partial) (Adult)  Goal: Signs and Symptoms of Listed Potential Problems Will be Absent, Minimized or Managed (Knee Arthroplasty)  Outcome: Ongoing (interventions implemented as appropriate)    Goal: Anesthesia/Sedation Recovery  Outcome: Ongoing (interventions implemented as appropriate)      Problem: Fall Risk (Adult)  Goal: Identify Related Risk Factors and Signs and Symptoms  Outcome: Outcome(s) achieved Date Met: 03/05/19    Goal: Absence of Fall  Outcome: Ongoing (interventions implemented as appropriate)

## 2019-03-05 NOTE — ANESTHESIA PREPROCEDURE EVALUATION
Anesthesia Evaluation     Patient summary reviewed and Nursing notes reviewed   NPO Solid Status: > 8 hours  NPO Liquid Status: > 4 hours           Airway   Mallampati: III  TM distance: >3 FB  Neck ROM: full  No difficulty expected  Dental      Pulmonary - normal exam   (+) a smoker Former,   Cardiovascular - negative cardio ROS and normal exam    ECG reviewed        Neuro/Psych- negative ROS  GI/Hepatic/Renal/Endo    (+) morbid obesity,      Musculoskeletal     Abdominal    Substance History - negative use     OB/GYN          Other   (+) arthritis                   Anesthesia Plan    ASA 3     general     Anesthetic plan, all risks, benefits, and alternatives have been provided, discussed and informed consent has been obtained with: patient.

## 2019-03-05 NOTE — ANESTHESIA PROCEDURE NOTES
Peripheral Block      Patient reassessed immediately prior to procedure    Patient location during procedure: holding area  Start time: 3/5/2019 11:10 AM  Stop time: 3/5/2019 11:16 AM  Reason for block: at surgeon's request and post-op pain management  Performed by  Anesthesiologist: Doug Amado MD  Preanesthetic Checklist  Completed: patient identified, site marked, surgical consent, pre-op evaluation, timeout performed, IV checked, risks and benefits discussed and monitors and equipment checked  Prep:  Pt Position: supine  Sterile barriers:cap and gloves  Prep: ChloraPrep  Patient monitoring: blood pressure monitoring, continuous pulse oximetry and EKG  Procedure  Sedation:yes    Guidance:ultrasound guided  ULTRASOUND INTERPRETATION. Using ultrasound guidance a gauge needle was placed in close proximity to the femoral nerve, at which point, under ultrasound guidance anesthetic was injected in the area of the nerve and spread of the anesthesia was seen on ultrasound in close proximity thereto.  There were no abnormalities seen on ultrasound; a digital image was taken; and the patient tolerated the procedure with no complications. Images:still images obtained    Laterality:right  Block Type:femoral  Injection Technique:single-shotNeedle Gauge:20 G    Medications Used: bupivacaine-EPINEPHrine PF (MARCAINE w/EPI) 0.5% -[id number] injection, 30 mL  Med admintered at 3/5/2019 11:16 AM  Post Assessment  Injection Assessment: negative aspiration for heme, no paresthesia on injection and incremental injection  Patient Tolerance:comfortable throughout block  Complications:no  Additional Notes  Unable to visualize AC secondary to patient size therefore femoral nerve block performed

## 2019-03-05 NOTE — OP NOTE
TOTAL KNEE ARTHROPLASTY  Procedure Note    Marina Titus  3/5/2019    Pre-op Diagnosis: Osteoarthritis right knee primary generalized  Post-op Diagnosis:Same  Procedure: Right Total Knee Arthroplasty  Surgeon:  Tobi Rod MD  Anesthesia: General with Block, Anesthesiologist: Hayden Brooks MD; Rachelle Bowen MD  CRNA: Jessica Delgado CRNA  Staff: Circulator: Dot Colbert RN; Jesi Singh RN  Scrub Person: Surya Link  Vendor Representative: Mynor Sellers  Assistant: Truman Suazo CSA CFA  Estimated Blood Loss: 25 ml  Specimens: * No orders in the log *  Drains: none  Complications: None    Components Utilized:   Moe  Implant Name Type Inv. Item Serial No.  Lot No. LRB No. Used   CMT BONE PALACOS R HI/VISC 1X40 - [id number] Implant CMT BONE PALACOS R HI/VISC 1X40  [facility] [id number] Right 2   COMP FEM PERSONA CR CMT NRW SZ10 RT - [id number] Implant COMP FEM PERSONA CR CMT NRW SZ10 RT  [employer] [id number] Right 1   EXT STEM PERSONA TPR [id number] - [id number] Implant EXT STEM PERSONA TPR [id number]  [employer] [id number] Right 1   STEM TIB PERSONA CMT 5D JESSICA RT - [id number] Implant STEM TIB PERSONA CMT 5D JESSICA RT  [employer] [id number] Right 1   ART/SRF KN PERSONA/VE MC EF 8TO11 10MM RT - [id number] Implant ART/SRF KN PERSONA/VE MC EF 8TO11 10MM RT  [employer] [id number] Right 1         Indication for Procedure:   The patient is a 48 y.o. female presents today for a total knee arthroplasty procedure because of failure to conservatively manage the patients pain for arthritis.  The patient was educated in risks of surgery that could include possible risk of infection, deep venous thrombosis, pulmonary embolism, fracture, neurovascular injury, leg length discrepancy, dislocation, possible persistent pain, need for additional surgeries, anesthetic risks, medical risks including heart attack and stroke, and death.  The discussion occurred in the  office pre-operatively, and patient had the opportunity to ask questions, and concerns about the proposed surgery.  The patient also understood that medicine is not an exact science, and that outcomes of the surgical procedure may be less than desired. The patient wished to proceed.      Protocols for intravenous antibiotics and venous thrombosis were followed for this patient.  IV antibiotics were infused prior to surgery and will be discontinued within 24 hours of completion of the surgical procedure.  Thrombosis prophylaxis will be initiated within 24 hours of the completion of the surgical procedure.      Procedure:   After the patient was identified in the preoperative area, and the surgical site confirmed and marked, the patient was brought to the operating room on a stretcher.  They were placed supine on the operating room table and the above anesthetic was placed uneventfully.  Perioperative antibiotics were administered which was IV Ancef.  The patient also received tranexamic acid.  A time-out procedure was performed.  The intravenous antibiotics infusion was completed.  A non-sterile tourniquet was placed on the operative thigh, and was prepped and draped in the usual sterile fashion.  An Esmarch was to exsanguinate the limb, and the tourniquet was inflated.     A 10 blade scalpel was used to make a longitudinal incision from above the patella to medial to the tibial tubercle.  A medial wilma-patellar arthrotomy was performed with another 10 blade scalpel.  The fat pat was preserved and used as a medial retractor.  The medial joint line was elevated subperiosteally with electrocautery and a timmons elevator.   The patella was everted and a lateral denervation was performed as well as a lateral facet ostectomy.  The knee was then flexed and Colorado's line was drawn on the distal femur with electrocautery.  Then the drill was placed into the distal femur into the medullary canal.  The cartilage was removed  from the medial femoral condyle.  The intramedullary distal femoral valgus cutting guide set to 5 degrees of femoral valgus was then placed into the medullary canal.  It was then pinned and the oscillating saw was used to make the osteotomy.    Then the extramedullary cutting guide was placed aligned with the tibial eminence superiorly and the tibial shaft distally, and the cut was aligned for a neutral cut along the mechanical axis.  The oscillating saw was then used to complete the osteotomy cuts.     The 4-in-1 distal femoral cutting guide was then placed after a sizer was applied to the distal femur in 3° of external rotation.  The 4-in-1 cutting guide was placed the knee was examined flexion gaps were felt to be nearly equal medially and laterally.  The 4-in-1 cutting block was then used to make the femoral osteotomy.   A laminar  was then placed medially and then laterally to remove the medial and lateral meniscus and the posterior osteophytes.  At this point the posterior cruciate ligament was removed.  The posterior medial and posterior lateral capsule was preserved.  A spacer block was placed and the knee at flexion and the knee was balanced in flexion for a medial pivot.  The knee was then placed in extension and confirmed to be balanced as well.  At this point the femoral trial was applied the tibial baseplate was inserted and the tibia was floated to assess rotation.  The knee was examined again was stable throughout range of motion.   The lug holes were drilled in the distal femur.  The tibia was drilled and broached in the typical fashion.  The trial components were then removed and the final implants were confirmed and opened.  The bone surfaces were then irrigated and dried.  Periarticular injection was placed in the posterior capsule Palacos cement was then mixed and placed on the cut bone surfaces and back side of the implants.  The implants were then impacted into place, and the trial  polyethylene spacer was placed and the knee was placed into extension.  A stack of towels was placed under the ankle and the cement was allowed to harden.  Once the cement had cured the knee was again ranged and confirmed to be balanced and have excellent range of motion.  The definitive tibial insert was placed The tourniquet was then dropped.  Hemostasis was obtained.  The wound was then irrigated with the pulse lavage irrigation system with a 3 liter mixture of bacitracin and normal saline.  The local mixture was injected throughout the knee for post-operative pain control.   The arthrotomy was then closed using 0 Vicryl suture in running fashion for the mid vastus approach.  #2 Ethibond sutures were used as stay sutures to close the arthrotomy followed by #1 stratofix PDS.  The deep dermis was closed with 2-0 Vicryl, and the skin was closed with 3-0 Monocryl stratofix suture.  Skin glue was applied and sterile dressing were applied.   Sponge and needle counts were completed and were correct.  The patient was awakened from anesthetic and was returned to recovery in stable condition.    The patient will be weightbearing as tolerated to the right lower extremity.  Should be range of motion as tolerated.  She will receive aspirin 325 mg twice daily for DVT prophylaxis.    Tobi Rod MD  Orthopaedic Surgeon    [facility]  [phone]          Date: 3/5/2019  Time: 4:21 PM

## 2019-03-06 VITALS
WEIGHT: 293 LBS | TEMPERATURE: 97.8 F | OXYGEN SATURATION: 99 % | RESPIRATION RATE: 16 BRPM | BODY MASS INDEX: 39.68 KG/M2 | DIASTOLIC BLOOD PRESSURE: 67 MMHG | HEART RATE: 89 BPM | SYSTOLIC BLOOD PRESSURE: 139 MMHG | HEIGHT: 72 IN

## 2019-03-06 LAB
ANION GAP SERPL CALCULATED.3IONS-SCNC: 10 MMOL/L
BUN BLD-MCNC: 7 MG/DL (ref 6–20)
BUN/CREAT SERPL: 11.5 (ref 7–25)
CALCIUM SPEC-SCNC: 8.3 MG/DL (ref 8.6–10.5)
CHLORIDE SERPL-SCNC: 109 MMOL/L (ref 98–107)
CO2 SERPL-SCNC: 23 MMOL/L (ref 22–29)
CREAT BLD-MCNC: 0.61 MG/DL (ref 0.57–1)
GFR SERPL CREATININE-BSD FRML MDRD: 105 ML/MIN/1.73
GLUCOSE BLD-MCNC: 120 MG/DL (ref 65–99)
HCT VFR BLD AUTO: 35.4 % (ref 34–46.6)
HGB BLD-MCNC: 11.1 G/DL (ref 12–15.9)
POTASSIUM BLD-SCNC: 4.2 MMOL/L (ref 3.5–5.2)
SODIUM BLD-SCNC: 142 MMOL/L (ref 136–145)

## 2019-03-06 PROCEDURE — 97161 PT EVAL LOW COMPLEX 20 MIN: CPT

## 2019-03-06 PROCEDURE — 85014 HEMATOCRIT: CPT | Performed by: ORTHOPAEDIC SURGERY

## 2019-03-06 PROCEDURE — 97110 THERAPEUTIC EXERCISES: CPT

## 2019-03-06 PROCEDURE — 25010000002 DEXAMETHASONE PER 1 MG: Performed by: ORTHOPAEDIC SURGERY

## 2019-03-06 PROCEDURE — 80048 BASIC METABOLIC PNL TOTAL CA: CPT | Performed by: ORTHOPAEDIC SURGERY

## 2019-03-06 PROCEDURE — 85018 HEMOGLOBIN: CPT | Performed by: ORTHOPAEDIC SURGERY

## 2019-03-06 RX ORDER — HYDROCODONE BITARTRATE AND ACETAMINOPHEN 7.5; 325 MG/1; MG/1
1-2 TABLET ORAL EVERY 4 HOURS PRN
Qty: 56 TABLET | Refills: 0 | Status: SHIPPED | OUTPATIENT
Start: 2019-03-06 | End: 2019-03-15

## 2019-03-06 RX ORDER — DOCUSATE SODIUM 100 MG/1
100 CAPSULE, LIQUID FILLED ORAL 2 TIMES DAILY PRN
Qty: 60 CAPSULE | Refills: 0 | Status: SHIPPED | OUTPATIENT
Start: 2019-03-06

## 2019-03-06 RX ADMIN — HYDROCODONE BITARTRATE AND ACETAMINOPHEN 2 TABLET: 7.5; 325 TABLET ORAL at 05:29

## 2019-03-06 RX ADMIN — ASPIRIN 325 MG: 325 TABLET, DELAYED RELEASE ORAL at 09:29

## 2019-03-06 RX ADMIN — DEXAMETHASONE SODIUM PHOSPHATE 4 MG: 4 INJECTION, SOLUTION INTRAMUSCULAR; INTRAVENOUS at 09:30

## 2019-03-06 RX ADMIN — HYDROCODONE BITARTRATE AND ACETAMINOPHEN 2 TABLET: 7.5; 325 TABLET ORAL at 09:29

## 2019-03-06 RX ADMIN — HYDROCODONE BITARTRATE AND ACETAMINOPHEN 2 TABLET: 7.5; 325 TABLET ORAL at 00:21

## 2019-03-06 RX ADMIN — Medication 3 ML: at 09:30

## 2019-03-06 NOTE — PLAN OF CARE
Problem: Patient Care Overview  Goal: Plan of Care Review   03/06/19 7513   Coping/Psychosocial   Plan of Care Reviewed With patient   OTHER   Outcome Summary Pt is a 48 y.o. female admitted to [facility] for R TKR on 3/5/2019. Pt presented today with expected postop impairments in ROM, strength, and functional mobility, and requires SV for bed mobility, min A for STS transfers, and CGA for amb with FWW for 60 ft. Pt will be living with parents and DC with no MARIE and already has equipment. Pt denies any questions or concerns for PT at this time. Pt has no further acute skilled PT needs at this time and is appropriate for DC home with assist and HH PT once medically appropriate.

## 2019-03-06 NOTE — PLAN OF CARE
Problem: Patient Care Overview  Goal: Plan of Care Review  Outcome: Ongoing (interventions implemented as appropriate)   03/06/19 0709   Coping/Psychosocial   Plan of Care Reviewed With patient;mother   Plan of Care Review   Progress improving   OTHER   Outcome Summary PEripheral vascular assessment WNL. VSS. Dressing CDI. Ambulating with walker and assist X1. Reports feeling increased intensity of pain-but being well managed with PO Pain medication. Verbalizes understandig of education. Plans to d/c home today       Problem: Knee Arthroplasty (Total, Partial) (Adult)  Goal: Signs and Symptoms of Listed Potential Problems Will be Absent, Minimized or Managed (Knee Arthroplasty)  Outcome: Ongoing (interventions implemented as appropriate)      Problem: Fall Risk (Adult)  Goal: Absence of Fall  Outcome: Ongoing (interventions implemented as appropriate)

## 2019-03-06 NOTE — DISCHARGE SUMMARY
Discharge Summary    Date of Admission: 3/5/2019  9:33 AM  Date of Discharge:  3/6/2019    Discharge Diagnosis:   S/P total knee arthroplasty, right [Z96.651]      PMHX:   Past Medical History:   Diagnosis Date   • Arthritis        Discharge Disposition      Procedures Performed  Procedure(s):  RIGHT TOTAL KNEE ARTHROPLASTY       Indication for Admission  Patient is a 48 y.o. female admitted after undergoing the above surgical procedure. They were admitted for post-operative pain control, medical management and physical therapy.  They progressed with physical therapy.    They were deemed stable for discharge.      Consults:   Consults     No orders found for last 30 day(s).          Discharge Instructions:  Patient is weight bearing as tolerated on the operative leg.  Patient is to progress range of motion and ambulation as tolerated.  Use walker as needed for stability and gait.  May progress to cane as tolerated.  The dressing should be left on knee until post-operative day 7, and then removed.  Dressing is waterproof. Patient may shower 3 days after the operation with the dressing in place. Replace the dressing if it becomes wet or saturated. Use compression stockings for leg swelling.  Patient will follow-up in the office in 3 weeks.  Call the office at [phone] for any questions or concerns.      Discharge Medications     Discharge Medications      ASK your doctor about these medications      Instructions Start Date   cetirizine 10 MG tablet  Commonly known as:  zyrTEC   10 mg, Oral, Daily PRN      Chlorhexidine Gluconate Cloth 2 % pads   1 application, Apply externally, 2 Times Daily      mupirocin 2 % nasal ointment  Commonly known as:  BACTROBAN   1 application, Nasal, 2 Times Daily             Discharge Diet: Regular diet    Activity at Discharge: Weight bearing as tolerated, activity as tolerated    Follow-up Appointments  No future appointments.           03/06/19,  7:33 AM    Tobi Rod  MD  Orthopaedic Surgeon    [facility]  [phone]

## 2019-03-06 NOTE — THERAPY DISCHARGE NOTE
Acute Care - Physical Therapy Initial Eval/Discharge  [facility]     Patient Name: Marina Titus  : 1970  MRN: [id number]  Today's Date: 3/6/2019   Onset of Illness/Injury or Date of Surgery: 19  Date of Referral to PT: 19  Referring Physician: Tobi Rod MD      Admit Date: 3/5/2019    Visit Dx:    ICD-10-CM ICD-9-CM   1. Difficulty walking R26.2 719.7     There is no problem list on file for this patient.    Past Medical History:   Diagnosis Date   • Arthritis      Past Surgical History:   Procedure Laterality Date   • KNEE ARTHROSCOPY     • TONSILLECTOMY     • TOTAL KNEE ARTHROPLASTY Right 3/5/2019    Procedure: RIGHT TOTAL KNEE ARTHROPLASTY;  Surgeon: Tobi Rod MD;  Location: [facility] OR;  Service: Orthopedics   • WISDOM TOOTH EXTRACTION            PT ASSESSMENT (last 12 hours)      Physical Therapy Evaluation     Row Name 19 1131          PT Evaluation Time/Intention    Subjective Information  complains of;pain;weakness  -CA     Document Type  evaluation  -CA     Mode of Treatment  individual therapy;physical therapy  -CA     Patient Effort  good  -CA     Symptoms Noted During/After Treatment  increased pain  -CA     Row Name 19 1131          General Information    Patient Profile Reviewed?  yes  -CA     Onset of Illness/Injury or Date of Surgery  19  -CA     Referring Physician  Tobi Rod MD  -CA     Patient Observations  alert;cooperative;agree to therapy  -CA     Pertinent History of Current Functional Problem  R TKA  -CA     Existing Precautions/Restrictions  fall  -CA     Row Name 19 1131          Resource/Environmental Concerns    Current Living Arrangements  home/apartment/condo  -CA     Row Name 19 1131          Cognitive Assessment/Intervention- PT/OT    Orientation Status (Cognition)  oriented x 4  -CA     Follows Commands (Cognition)  WNL  -CA     Row Name 19 1131          Bed Mobility Assessment/Treatment    Bed Mobility  Assessment/Treatment  supine-sit;sit-supine  -CA     Supine-Sit Star (Bed Mobility)  supervision  -CA     Sit-Supine Star (Bed Mobility)  supervision  -CA     Row Name 03/06/19 1131          Transfer Assessment/Treatment    Transfer Assessment/Treatment  sit-stand transfer;stand-sit transfer  -CA     Sit-Stand Star (Transfers)  minimum assist (75% patient effort)  -CA     Stand-Sit Star (Transfers)  contact guard  -CA     Row Name 03/06/19 1131          Sit-Stand Transfer    Assistive Device (Sit-Stand Transfers)  walker, front-wheeled  -CA     Row Name 03/06/19 1131          Stand-Sit Transfer    Assistive Device (Stand-Sit Transfers)  walker, front-wheeled  -CA     Row Name 03/06/19 1131          Gait/Stairs Assessment/Training    Gait/Stairs Assessment/Training  gait/ambulation independence  -CA     Star Level (Gait)  contact guard  -CA     Assistive Device (Gait)  walker, front-wheeled  -CA     Distance in Feet (Gait)  60  -CA     Pattern (Gait)  step-through  -CA     Deviations/Abnormal Patterns (Gait)  antalgic;base of support, wide;gait speed decreased;hosea decreased  -CA     Row Name 03/06/19 1131          General ROM    GENERAL ROM COMMENTS  grossly wfl, exception R knee NT  -[facility] Name 03/06/19 1131          MMT (Manual Muscle Testing)    General MMT Comments  grossly at least 3/5, exception R knee NT  -[facility] Name 03/06/19 1131          Motor Assessment/Intervention    Additional Documentation  Therapeutic Exercise Interventions (Group)  -CA     Row Name 03/06/19 1131          Therapeutic Exercise    Comment (Therapeutic Exercise)  x10 reps TKR protocol  -[facility] Name 03/06/19 1131          Sensory Assessment/Intervention    Sensory General Assessment  no sensation deficits identified  -CA     Row Name 03/06/19 1131          Pain Assessment    Additional Documentation  Pain Scale: Numbers Pre/Post-Treatment (Group)  -[facility] Name 03/06/19 1131           Pain Scale: Numbers Pre/Post-Treatment    Pain Scale: Numbers, Pretreatment  4/10  -CA     Pain Location - Side  Right  -CA     Pain Location  knee  -CA     Pain Intervention(s)  Cold applied;Repositioned;Ambulation/increased activity  -CA     Row Name             Wound 03/05/19 1602 Right knee incision    Wound - Properties Group Date first assessed: 03/05/19  - Time first assessed: 1602  - Side: Right  - Location: knee  -HH Type: incision  -HH    Row Name 03/06/19 1131          Physical Therapy Clinical Impression    Date of Referral to PT  03/05/19  -CA     Row Name 03/06/19 1131          Positioning and Restraints    Pre-Treatment Position  in bed  -CA     Post Treatment Position  bed  -CA     In Bed  supine;call light within reach;encouraged to call for assist;with family/caregiver;side rails up x2  -CA       User Key  (r) = Recorded By, (t) = Taken By, (c) = Cosigned By    Initials Name Provider Type     Jesi Singh, RN Registered Nurse    Shey Borden, PT Physical Therapist          Physical Therapy Education     Title: PT OT SLP Therapies (Done)     Topic: Physical Therapy (Done)     Point: Mobility training (Done)     Learning Progress Summary           Patient Acceptance, E, VU by CA at 3/6/2019 11:34 AM                   Point: Home exercise program (Done)     Learning Progress Summary           Patient Acceptance, E, VU by CA at 3/6/2019 11:34 AM                   Point: Body mechanics (Done)     Learning Progress Summary           Patient Acceptance, E, VU by CA at 3/6/2019 11:34 AM                   Point: Precautions (Done)     Learning Progress Summary           Patient Acceptance, E, VU by CA at 3/6/2019 11:34 AM                               User Key     Initials Effective Dates Name Provider Type [facility] 06/13/18 -  Shey Badillo, PT Physical Therapist PT                PT Recommendation and Plan  Anticipated Discharge Disposition (PT): home with assist, home  with home health  Therapy Frequency (PT Clinical Impression): evaluation only  Outcome Summary/Treatment Plan (PT)  Anticipated Discharge Disposition (PT): home with assist, home with home health  Plan of Care Reviewed With: patient  Outcome Summary: Pt is a 48 y.o. female admitted to [facility] for R TKR on 3/5/2019. Pt presented today with expected postop impairments in ROM, strength, and functional mobility, and requires SV for bed mobility, min A for STS transfers, and CGA for amb with FWW for 60 ft. Pt will be living with parents and DC with no MARIE and already has equipment. Pt denies any questions or concerns for PT at this time. Pt has no further acute skilled PT needs at this time and is appropriate for DC home with assist and  PT once medically appropriate.     Outcome Measures     Row Name 03/06/19 1100             How much help from another person do you currently need...    Turning from your back to your side while in flat bed without using bedrails?  4  -CA      Moving from lying on back to sitting on the side of a flat bed without bedrails?  4  -CA      Moving to and from a bed to a chair (including a wheelchair)?  3  -CA      Standing up from a chair using your arms (e.g., wheelchair, bedside chair)?  3  -CA      Climbing 3-5 steps with a railing?  2  -CA      To walk in hospital room?  3  -CA      AM-PAC 6 Clicks Score  19  -CA         Functional Assessment    Outcome Measure Options  AM-PAC 6 Clicks Basic Mobility (PT)  -CA        User Key  (r) = Recorded By, (t) = Taken By, (c) = Cosigned By    Initials Name Provider Type    Shey Borden, SHARMAINE Physical Therapist           Time Calculation:   PT Charges     Row Name 03/06/19 1136             Time Calculation    Start Time  1009  -CA      Stop Time  1030  -CA      Time Calculation (min)  21 min  -CA      PT Received On  03/06/19  -CA         Time Calculation- PT    Total Timed Code Minutes- PT  10 minute(s)  -CA        User Key  (r) = Recorded By,  (t) = Taken By, (c) = Cosigned By    Initials Name Provider Type    Shey Borden, PT Physical Therapist        Therapy Suggested Charges     Code   Minutes Charges    None           Therapy Charges for Today     Code Description Service Date Service Provider Modifiers Qty    54064939734 HC PT EVAL LOW COMPLEXITY 2 3/6/2019 Shey Badillo, PT GP 1    04895904258 HC PT THER PROC EA 15 MIN 3/6/2019 Shey Badillo, PT GP 1          PT G-Codes  Outcome Measure Options: AM-PAC 6 Clicks Basic Mobility (PT)  AM-PAC 6 Clicks Score: 19    PT Discharge Summary  Anticipated Discharge Disposition (PT): home with assist, home with home health    Shey Badillo, SHARMAINE  3/6/2019

## 2019-03-06 NOTE — PROGRESS NOTES
Orthopedic Progress Note    Subjective :   Some pain in right knee but overall well controlled.  Denies any chest pain or shortness of breath    Objective :    Vital signs in last 24 hours:  Temp:  [97.1 °F (36.2 °C)-98.3 °F (36.8 °C)] 97.3 °F (36.3 °C)  Heart Rate:  [74-97] 77  Resp:  [12-18] 16  BP: ()/(59-96) 94/59  Vitals:    03/05/19 1850 03/05/19 1955 03/05/19 2318 03/06/19 0319   BP: 157/87 161/89 128/83 94/59   BP Location: Left arm  Left arm Left arm   Patient Position: Sitting  Sitting Lying   Pulse: 79 79 90 77   Resp: 18 14 16 16   Temp: 97.1 °F (36.2 °C)  97.6 °F (36.4 °C) 97.3 °F (36.3 °C)   TempSrc: Oral  Oral Oral   SpO2: 99% 99% 99% 98%   Weight:       Height:           PHYSICAL EXAM:  Patient is calm, in no acute distress, awake and oriented x 3.  Dressing clean, dry and intact.  No signs of infection.  Swelling is appropriate.  Ecchymosis is appropriate in amount.  Homans test is negative.  Patient is neurovascularly intact distally.      LABS:  Results from last 7 days   Lab Units 03/06/19  0506   HEMOGLOBIN g/dL 11.1*   HEMATOCRIT % 35.4     Results from last 7 days   Lab Units 03/06/19  0506   SODIUM mmol/L 142   POTASSIUM mmol/L 4.2   CHLORIDE mmol/L 109*   CO2 mmol/L 23.0   BUN mg/dL 7   CREATININE mg/dL 0.61   GLUCOSE mg/dL 120*   CALCIUM mg/dL 8.3*           ASSESSMENT:  Status post right total knee arthroplasty    Plan:  Continue Physical Therapy, WBAT and ROM as tolerated  Continue SCDs, Continue Aspirin 325 mg BIDfor DVT prophylaxis.  Dispo planning for DC to home later today    Tobi Rod MD    Date: 3/6/2019  Time: 7:12 AM    Tobi Rod MD  Orthopaedic Surgeon    [facility]  [phone]

## 2019-03-06 NOTE — DISCHARGE INSTRUCTIONS
.    Dr. Tobi Rod Total Knee Joint Replacement Discharge Instructions:  Office Phone Number: [phone]    I. ACTIVITIES:  1. Exercises:  ? Complete exercise program as taught by the hospital physical therapist 2 times per day.  You may wean off the walker to a cane when directed by the physical therapist.  ? Exercise program will be advanced by the physical therapist  ? During the day be up ambulating every 2 hours (while awake) for short distances  ? Complete the ankle pump exercises at least 10 times per hour (while awake)  ? Elevate legs most of the day the first week post operatively and thereafter elevate legs when in bed and for at least 30 minutes during the day. Use cold packs 20-30 minutes approximately 5 times per day. This should be done before and after completing your exercises and at any time you are experiencing pain/ stiffness in your operative extremity.      2. Activities of Daily Living:  ? No tub baths, hot tubs, or swimming pools for 4 weeks  ? The clear dressing with thin white gauze strip dressing is waterproof.  You may shower without covering the dressing beginning 3 days after the operation.  After 7 days you may remove the dressing.  If the dressing becomes saturated prior to day 7, it may be changed.  After dressing removal, do not scrub or rub the incision. Allow skin glue to fall off over the next few weeks.  After the dressing is removed, simply let the water run over the incision and pat dry.    II. Restrictions  ? Follow any movement restrictions that was discussed with you by either Dr. Rod or the physical therapist.     ? Avoid kneeling on the knee that was operated on  ? Dr. Rod will discuss with you when you will be able to drive again at your first post-op appointment.  ? Weight bearing is as tolerated.  ? First week stay inside on even terrain. May go up and down stairs one stair at a time utilizing the hand rail.  ? Once you feel confident, you may venture  outside.    Exercises:  · Perform quad sets as instructed by the therapist at least 3 times a day  · Perform leg hangs with you heel placed on a chair or footrest and allow you knee to stretch towards a more straightened position several times a day  · Work on knee flexion exercises at least three times daily.  · Avoid placing a pillow under your knee as this will cause your knee to become more stiff throughout the recovery process.    III. Precautions:  ? Everyone that comes near you should wash their hands  ? No elective dental, genital-urinary, or colon procedures or surgical procedures for 12 weeks after surgery unless absolutely necessary.  ?  If dental work or surgical procedure is deemed absolutely necessary within 12 weeks of surgery, you will need to contact Dr. Rod's office as you will need to take antibiotics 1 hour prior to any dental work (including teeth cleanings).  ? Dr. Rod will prescribe prophylactic antibiotics for all dental procedures for one year  as a precautionary measure to minimize risk of infection.  If you are a diabetic or take immunosuppressive medication, you may have to take prophylactic antibiotics the remainder of your life before dental work.    ? Avoid sick people. If you must be around someone who is ill, they should wear a mask.  ? Avoid visits to the Emergency Room or Urgent Care unless you are having a life threatening event.   ? If you have leg swelling you may wear leg compression stocking.   Stockings are to be placed on in the morning and removed at night. Monitor the stockings to ensure that any swelling is not causing the stockings to become too tight. In this case, remove stockings immediately.    IV. INCISION CARE:  ? Dr. Rod takes great care in closing your incision to give you the best opportunity for a healthy incision with minimal scarring. He places sutures below the skin surface that will eventually dissolve.  The incision is then covered with a skin  glue which makes the incision water tight, and minimizes bleeding onto the dressing.  No staples are used.  Occasionally one of the buried stitches may come to the skin surface and may need to be removed.  Please resist the temptation of removing the stitch by yourself.   will be happy to remove it for you.  Bruising around the incision and thigh is normal and to be expected.  Please keep dressing in place at least until post-op day 7. You may remove and replace dressing before day 7 if the dressing begins to fall off or becomes saturated. Wash your hands and under your finger nails prior to dressing changes.  After day 7 as long as incision is dry and intact, you may leave the dressing off and open to air.    ? If dressing must be changed, utilize dry gauze and paper tape. Avoid touching the side of the gauze that goes against the incision with your hands.  ? No creams or ointments to the incision until permission given by Dr. Rod.  ? Do not touch or pick at the incision, or try to remove any sutures or skin glue.  ? Check dressing every day and notify surgeon immediately if any of the following signs or symptoms are noted:  o Increase in redness  o Increase in swelling of the entire extremity that does not go away with elevation.  Notify office that you may have a blood clot.    o Drainage oozing from the incision  o Pulling apart of the edges of the incision  o Increase in overall body temperature (greater than 100.5 degrees)    V. Medications:   1. Anticoagulants: You will be discharged on an anticoagulant. This is a prophylactic medication that helps prevent blood clots during your post-operative period. The type and length of dosage varies based on your individual needs, procedure performed, and Dr. Rod's preference.  ? While taking the anticoagulant, you should avoid taking any additional aspirin than what is prescribed.   ? Notify surgeon immediately if any celso bleeding is noted in the  urine, stool, emesis, or from the nose or the incision. Blood in the stool will often appear as black rather than red. Blood in urine may appear as pink. Blood in emesis may appear as brown/black like coffee grounds.  ? You will need to apply pressure for longer periods of time to any cuts or abrasions to stop bleeding  ? Avoid alcohol while taking anticoagulants.    2. Stool Softeners: You will be at greater risk of constipation after surgery due to being less mobile and the pain medications.   ? Take stool softeners as instructed by your surgeon while on pain medications. Over the counter Colace 100 mg 1-2 capsules twice daily.   ? If stools become too loose or too frequent, please decreases the dosage or stop the stool softener.  ? If constipation occurs despite use of stool softeners, you are to continue the stool softeners and add a laxative (Milk of Magnesia 1 ounce daily as needed).  If no bowel movement occurs past 3 days, then purchase Magnesium citrate and drink 1/2 bottle every 8 hours (on ice tastes better) until success. If no bowel movement by post-operative day 5 please call Dr. Rod office for further instructions.   You may need to decrease or stop your pain medications if bowel movements to not occur.     ? Drink plenty of fluids, and eat fruits and vegetables during your recovery time.    3. Pain Medications utilized after surgery are narcotics and the law requires that the following information be given to all patients that are prescribed narcotics:  ? CLASSIFICATION: Pain medications are called Opioids and are narcotics  ? LEGALITIES: It is illegal to share narcotics with others and to drive within 24 hours of taking narcotics  ? POTENTIAL SIDE EFFECTS: Potential side effects of opioids include: nausea, vomiting, itching, dizziness, drowsiness, dry mouth, constipation, and difficulty urinating.  ? POTENTIAL ADVERSE EFFECTS:   o Opioid tolerance can develop with use of pain medications and this  "simply means that it requires more and more of the medication to control pain; however, this is seen more in patients that use opioids for longer periods of time.  o Opioid dependence can develop with use of Opioids and this simply means that to stop the medication can cause withdrawal symptoms; however, this is seen with patients that use Opioids for longer periods of time.  o Opioid addiction can develop with use of Opioids and the incidence of this is very unlikely in patients who take the medications as ordered and stop the medications as instructed.  o Opioid overdose can be dangerous, but is unlikely when the medication is taken as ordered and stopped when ordered. It is important not to mix opioids with alcohol or with and type of sedative such as Benadryl as this can lead to over sedation and respiratory difficulty.  ? DOSAGE:   o Pain medications will need to be taken consistently for the first few days to decrease pain and promote adequate pain relief and participation in physical therapy.  o After the initial surgical pain begins to resolve, you may begin to decrease the pain medication. By the end of 6 weeks, you should be off of pain medications except for before physical therapy or to help with pain when attempting to fall asleep.  Pain medications will be tapered to lesser dosages as you are further from your surgical day.  No pain medications will be provided after 3 months from surgery.     o Refills will not be given by the office during evening hours, or weekends.  o To seek refills on pain medications during the post-operative period, you must call the office 48 hours in advance to request the refill. The office will then notify you when to  the prescription. DO NOT wait until you are out of the medication to request a refill.  They can not be \"called in\" to the pharmacy.      How to Wean Off Pain Medication:   As you begin to feel better, gradually wean off the narcotic pain medication " and begin to use it only for breakthrough pain.  · Gradually reduce the total number of pills you take each day.  This can be done by taking fewer pills at a time or by increasing the amount of time between each pill.    · For example, if you were taking 2 pills every 6 hours you would be taking a total of 8 pills per day.  Reduce this to 6 pills per day, then 4-5 and so on.  This can be done by taking 1 pill at a time instead of 2, or by taking the pills every 8 hours instead of every 6.    · As you begin to wean from the narcotic pain medication, begin substituting with over the counter tylenol when you are not taking the narcotic.  Limit total tylenol dosage to less than 4 grams per day.    V. FOLLOW-UP VISITS:  ? You will need to follow up in the office with Dr. Rod at 3 weeks.  Please call [phone] if you need to confirm or reschedule your appointment time.   ? If you have any concerns or suspected complications prior to your follow up visit, please call your surgeons office. Do not wait until your appointment time if you suspect complications. These will need to be addressed in the office promptly.

## 2019-03-06 NOTE — H&P
Orthopaedic H&P      Patient: Marina Titus    Date of Admission: 3/5/2019  9:33 AM    YOB: 1970    Medical Record Number: [id number]    Attending Physician: Tobi Rod MD  Consulting Physician: Tobi Rod MD      Chief Complaints: S/P total knee arthroplasty, right [Z96.651]      History of Present Illness: 48 y.o. female admitted to [facility] with complaints of right knee pain.  She has right knee osteoarthritis that is failed conservative treatment.  Surgical and nonsurgical interventions have been discussed and the patient is interested in proceeding with a right total knee arthroplasty    Allergies:   Allergies   Allergen Reactions   • Sulfa Antibiotics GI Intolerance   • Penicillins Rash       Medications:   Home Medications:  Medications Prior to Admission   Medication Sig Dispense Refill Last Dose   • cetirizine (zyrTEC) 10 MG tablet Take 10 mg by mouth Daily As Needed for Allergies.   3/3/2019 at 2200   • Chlorhexidine Gluconate Cloth 2 % pads Apply 1 application topically 2 (Two) Times a Day.   3/5/2019 at 0730   • mupirocin (BACTROBAN) 2 % nasal ointment 1 application into the nostril(s) as directed by provider 2 (Two) Times a Day.   3/5/2019 at 0730       Current Medications:  Scheduled Meds:  aspirin 325 mg Oral Q12H   sodium chloride 3 mL Intravenous Q12H     Continuous Infusions:  lactated ringers 9 mL/hr Last Rate: 9 mL/hr (03/05/19 1108)   lactated ringers 100 mL/hr Last Rate: 100 mL/hr (03/05/19 1906)     PRN Meds:.•  acetaminophen  •  bisacodyl  •  docusate sodium  •  HYDROcodone-acetaminophen  •  HYDROcodone-acetaminophen  •  HYDROmorphone **AND** naloxone  •  ketorolac  •  magnesium hydroxide  •  ondansetron **OR** ondansetron ODT **OR** ondansetron  •  sodium chloride    Past Medical History:   Diagnosis Date   • Arthritis      Past Surgical History:   Procedure Laterality Date   • KNEE ARTHROSCOPY     • TONSILLECTOMY     • TOTAL KNEE ARTHROPLASTY Right 3/5/2019     Procedure: RIGHT TOTAL KNEE ARTHROPLASTY;  Surgeon: Tobi Rod MD;  Location: [facility] OR;  Service: Orthopedics   • WISDOM TOOTH EXTRACTION       Social History     Occupational History   • Not on file   Tobacco Use   • Smoking status: Former Smoker     Years: 10.00     Types: Cigarettes     Last attempt to quit: 2000     Years since quittin.0   Substance and Sexual Activity   • Alcohol use: Yes     Comment: 2 WEEKLY   • Drug use: No   • Sexual activity: Defer    Social History     Social History Narrative   • Not on file     Family History   Problem Relation Age of Onset   • Malig Hyperthermia Neg Hx          Review of Systems:   Constitutional: Negative for fatigue, fever, or weight loss  HEENT: Patient denies any headaches, vision changes, sore throat. Admits to wearing glasses  Pulmonary: Patient denies any cough, congestion, SOA, or wheezing  Cardiovascular: Patient denies any chest pain, palpitations, weakness,  Gastrointestinal:  Patient denies nausea, vomiting, diarrhea, constipation  Genital/Urinary: Patient denies dysuria, urinary frequency, urgency, incontinence, retention  Musculoskeletal: Positive for right lower extremity pain. Negative for muscle cramps  Neurological: Patient denies dizziness, paresthesia, loss of sensation, seizures, syncope  Endocrine system: Patient denies tremors, cold or heat intolerance  Psychological: Patient denies thoughts/plans or harming self or other; hallucinations,  insomnia  Skin: Patient denies any bruising, rashes, lesions, or ulcers   Hematopoietic: Patient denies history of MRSA or slow wound healing,  spontaneous or excessive bleeding    Physical Exam: 48 y.o. female  Vitals:    19 1955 19 2318 19 0319 19 0738   BP: 161/89 128/83 94/59 109/65   BP Location:  Left arm Left arm Left arm   Patient Position:  Sitting Lying Lying   Pulse: 79 90 77 76   Resp: 14 16 16    Temp:  97.6 °F (36.4 °C) 97.3 °F (36.3 °C) 97.5 °F (36.4  °C)   TempSrc:  Oral Oral Oral   SpO2: 99% 99% 98% 97%   Weight:       Height:                                General Appearance:  Alert, cooperative, in no acute distress    HEENT:    Normocephalic,  Atraumatic, Pupils are equal   Neck:   No adenopathy, supple, trachea midline, no thyromegaly       Lungs:     Clear to auscultation, equal expansion bilaterally, respirations regular, even and               unlabored    Heart:    Regular rhythm and normal rate, normal S1 and S2, no murmur, no gallops   Chest Wall:    Within normal limits   Abdomen:     Normal bowel sounds, no masses,  soft non-tender, non-distended,       Rectal:  Musculoskeletal:     Deferred  The right knee has no prior incisions.  Range of motion is from 15-95 degrees.  There is medial joint line tenderness to palpation.  No skin lesions are appreciated.   Extremities:   No clubbing, no edema, no cyanosis   Pulses  Neurovascular:   Pulses palpable and equal bilaterally in all 4 extremities    Patient was alert and oriented x 3 spheres   Skin:   No lesions, ulcers or rashes   Neurologic:   Cranial nerves 2 - 12 grossly intact, sensation intact            Diagnostic Tests:    Admission on 03/05/2019   Component Date Value Ref Range Status   • HCG, Urine, QL 03/05/2019 Negative  Negative Final   • Lot Number 03/05/2019 [id number]   Final   • Internal Positive Control 03/05/2019 Positive   Final   • Internal Negative Control 03/05/2019 Negative   Final   • Hemoglobin 03/05/2019 14.3  12.0 - 15.9 g/dL Final   • Hematocrit 03/05/2019 43.8  34.0 - 46.6 % Final   • Glucose 03/06/2019 120* 65 - 99 mg/dL Final   • BUN 03/06/2019 7  6 - 20 mg/dL Final   • Creatinine 03/06/2019 0.61  0.57 - 1.00 mg/dL Final   • Sodium 03/06/2019 142  136 - 145 mmol/L Final   • Potassium 03/06/2019 4.2  3.5 - 5.2 mmol/L Final   • Chloride 03/06/2019 109* 98 - 107 mmol/L Final   • CO2 03/06/2019 23.0  22.0 - 29.0 mmol/L Final   • Calcium 03/06/2019 8.3* 8.6 - 10.5 mg/dL Final    • eGFR Non African Amer 03/06/2019 105  >60 mL/min/1.73 Final   • BUN/Creatinine Ratio 03/06/2019 11.5  7.0 - 25.0 Final   • Anion Gap 03/06/2019 10.0  mmol/L Final   • Hemoglobin 03/06/2019 11.1* 12.0 - 15.9 g/dL Final   • Hematocrit 03/06/2019 35.4  34.0 - 46.6 % Final       No results found.      Assessment:  Right knee osteoarthritis        Plan:  The patient voiced understanding of the risks, benefits, and alternative forms of treatment that were discussed and the patient consents to proceed with right total knee arthroplasty    Date: 3/6/2019  Tobi Rod MD

## 2019-03-06 NOTE — DISCHARGE PLACEMENT REQUEST
"Marina Titus (48 y.o. Female)     Date of Birth Social Security Number Address Home Phone MRN    1970  [address] [id number] [phone] [id number]    Amish Marital Status          Hoahaoism Single       Admission Date Admission Type Admitting Provider Attending Provider Department, Room/Bed    3/5/19 Elective Tobi Rod MD Keller, Tyler, MD [address], P793/1    Discharge Date Discharge Disposition Discharge Destination         Home or Self Care              Attending Provider:  Tobi Rod MD    Allergies:  Sulfa Antibiotics, Penicillins    Isolation:  None   Infection:  None   Code Status:  CPR    Ht:  182.9 cm (72.01\")   Wt:  133 kg (293 lb 3.4 oz)    Admission Cmt:  None   Principal Problem:  None                Active Insurance as of 3/5/2019     Primary Coverage     Payor Plan Insurance Group Employer/Plan Group    MISC COMMERCIAL MISC COMMERCIAL INS [id number]     Coverage Address Coverage Phone Number Coverage Fax Number Effective Dates    P O [phone]  1/1/2019 - None Entered    [phone]       [facility] [address]       Subscriber Name Subscriber Birth Date Member ID       MARINA TITUS 1970 [id number]                 Emergency Contacts      (Rel.) Home Phone Work Phone Mobile Phone    NARAYAN TITUS (Mother) -- -- [phone]              "

## 2019-03-06 NOTE — PROGRESS NOTES
Discharge Planning Assessment  [facility]     Patient Name: Marina Titus  MRN: [id number]  Today's Date: 3/6/2019    Admit Date: 3/5/2019    Discharge Needs Assessment     Row Name 03/06/19 1319       Living Environment    Lives With  alone    Current Living Arrangements  home/apartment/condo    Family Caregiver if Needed  parent(s)    Quality of Family Relationships  helpful;supportive;involved    Able to Return to Prior Arrangements  yes       Resource/Environmental Concerns    Resource/Environmental Concerns  none       Transition Planning    Patient/Family Anticipates Transition to  home with family    Transportation Anticipated  family or friend will provide       Discharge Needs Assessment    Concerns to be Addressed  no discharge needs identified    Equipment Currently Used at Home  none    Discharge Facility/Level of Care Needs  home with home health    Offered/Gave Vendor List  yes        Discharge Plan     Row Name 03/06/19 1320       Plan    Plan  Home w/ family and [facility].     Patient/Family in Agreement with Plan  yes    Plan Comments  Met w/ pt and family at the bedside, verified facesheet and d/c plan. Pt plans to d/c home w/ the help of her mother. She requested [facility], [facility] is oon w/ pt's [employer] benefits. Pt is agreeable w/ referral to [facility] for in-home therapy. Danitza/Nancy notified and accepted.     Final Discharge Disposition Code  01 - home or self-care        Destination      No service coordination in this encounter.      Durable Medical Equipment      No service coordination in this encounter.      Dialysis/Infusion      No service coordination in this encounter.      Home Medical Care - Selection Complete      Service Provider Request Status Selected Services Address Phone Number Fax Number    [facility] Selected Home Health Services [id number], [facility] [address] [phone] --    [facility] [place] Declined N/A [address]  [address] [address] [phone]      Community Resources      No service coordination in this encounter.        Expected Discharge Date and Time     Expected Discharge Date Expected Discharge Time    Mar 6, 2019         Demographic Summary    No documentation.       Functional Status     Row Name 03/06/19 1320       Functional Status    Usual Activity Tolerance  good    Current Activity Tolerance  fair       Functional Status, IADL    Medications  independent    Meal Preparation  independent    Housekeeping  independent    Laundry  independent    Shopping  independent       Mental Status    General Appearance WDL  WDL       Mental Status Summary    Recent Changes in Mental Status/Cognitive Functioning  no changes        Psychosocial    No documentation.       Abuse/Neglect    No documentation.       Legal    No documentation.       Substance Abuse    No documentation.       Patient Forms    No documentation.           Samantha Muro RN

## 2019-03-06 NOTE — PAYOR COMM NOTE
"DISCHARGED        Marina Titus (48 y.o. Female)     Date of Birth Social Security Number Address Home Phone MRN    1970  [address] [facility] [phone] [id number]    Orthodox Marital Status          Tenriism Single       Admission Date Admission Type Admitting Provider Attending Provider Department, Room/Bed    3/5/19 Elective Tobi Rod MD  [address], P793/1    Discharge Date Discharge Disposition Discharge Destination        3/6/2019 Home or Self Care              Attending Provider:  (none)   Allergies:  Sulfa Antibiotics, Penicillins    Isolation:  None   Infection:  None   Code Status:  CPR    Ht:  182.9 cm (72.01\")   Wt:  133 kg (293 lb 3.4 oz)    Admission Cmt:  None   Principal Problem:  None                Active Insurance as of 3/5/2019     Primary Coverage     Payor Plan Insurance Group Employer/Plan Group    MISC COMMERCIAL MISC COMMERCIAL INS [id number]     Coverage Address Coverage Phone Number Coverage Fax Number Effective Dates    P O [phone]  1/1/2019 - None Entered    [phone]       [facility] [address]       Subscriber Name Subscriber Birth Date Member ID       MARINA TITUS 1970 [id number]                 Emergency Contacts      (Rel.) Home Phone Work Phone Mobile Phone    NARAYAN TITUS (Mother) -- -- [phone]               Discharge Summary      Tobi Rod MD at 3/6/2019  7:33 AM              Discharge Summary    Date of Admission: 3/5/2019  9:33 AM  Date of Discharge:  3/6/2019    Discharge Diagnosis:   S/P total knee arthroplasty, right [Z96.651]      PMHX:   Past Medical History:   Diagnosis Date   • Arthritis        Discharge Disposition      Procedures Performed  Procedure(s):  RIGHT TOTAL KNEE ARTHROPLASTY       Indication for Admission  Patient is a 48 y.o. female admitted after undergoing the above surgical procedure. They were admitted for post-operative pain control, medical " management and physical therapy.  They progressed with physical therapy.    They were deemed stable for discharge.      Consults:   Consults     No orders found for last 30 day(s).          Discharge Instructions:  Patient is weight bearing as tolerated on the operative leg.  Patient is to progress range of motion and ambulation as tolerated.  Use walker as needed for stability and gait.  May progress to cane as tolerated.  The dressing should be left on knee until post-operative day 7, and then removed.  Dressing is waterproof. Patient may shower 3 days after the operation with the dressing in place. Replace the dressing if it becomes wet or saturated. Use compression stockings for leg swelling.  Patient will follow-up in the office in 3 weeks.  Call the office at [phone] for any questions or concerns.      Discharge Medications     Discharge Medications      ASK your doctor about these medications      Instructions Start Date   cetirizine 10 MG tablet  Commonly known as:  zyrTEC   10 mg, Oral, Daily PRN      Chlorhexidine Gluconate Cloth 2 % pads   1 application, Apply externally, 2 Times Daily      mupirocin 2 % nasal ointment  Commonly known as:  BACTROBAN   1 application, Nasal, 2 Times Daily             Discharge Diet: Regular diet    Activity at Discharge: Weight bearing as tolerated, activity as tolerated    Follow-up Appointments  No future appointments.           03/06/19,  7:33 AM    Tobi Rod MD  Orthopaedic Surgeon    [facility]  [phone]                Electronically signed by Tobi Rod MD at 3/6/2019  7:33 AM

## 2021-03-24 ENCOUNTER — BULK ORDERING (OUTPATIENT)
Dept: CASE MANAGEMENT | Facility: OTHER | Age: 51
End: 2021-03-24

## 2021-03-24 DIAGNOSIS — Z23 IMMUNIZATION DUE: ICD-10-CM

## 2022-07-29 NOTE — ANESTHESIA POSTPROCEDURE EVALUATION
Patient: Marina Titus    Procedure Summary     Date:  03/05/19 Room / Location:  [facility] OR 09 / [facility] MAIN OR    Anesthesia Start:  1330 Anesthesia Stop:  1620    Procedure:  RIGHT TOTAL KNEE ARTHROPLASTY (Right Knee) Diagnosis:      Surgeon:  Tobi Rod MD Provider:  Hayden Brooks MD    Anesthesia Type:  general ASA Status:  3          Anesthesia Type: general  Last vitals  BP   144/96 (03/05/19 1730)   Temp   36.5 °C (97.7 °F) (03/05/19 1622)   Pulse   80 (03/05/19 1730)   Resp   18 (03/05/19 1730)     SpO2   97 % (03/05/19 1730)     Post Anesthesia Care and Evaluation    Patient location during evaluation: bedside  Patient participation: complete - patient participated  Level of consciousness: awake  Pain management: adequate  Airway patency: patent  Anesthetic complications: No anesthetic complications    Cardiovascular status: acceptable  Respiratory status: acceptable  Hydration status: acceptable    Comments: /96   Pulse 80   Temp 36.5 °C (97.7 °F) (Oral)   Resp 18   LMP 03/03/2019 Comment: HCG- NEG ON 3/5/19  SpO2 97%          This patient has been assessed with a concern for Malnutrition and has been determined to have a diagnosis/diagnoses of Severe protein-calorie malnutrition.    This patient is being managed with:   Diet NPO-  NPO for Procedure/Test     NPO Start Date: 22-Jul-2022   NPO Start Time: 01:00  Except Medications  Entered: Jul 29 2022 12:48AM    Diet Soft and Bite Sized-  Consistent Carbohydrate {Evening Snack} (CSTCHOSN)  For patients receiving Renal Replacement - No Protein Restr No Conc K No Conc Phos Low Sodium (RENAL)  Supplement Feeding Modality:  Oral  Nepro Cans or Servings Per Day:  3       Frequency:  Three Times a day  Entered: Jul 28 2022  3:13PM

## (undated) DEVICE — MEDI-VAC YANKAUER SUCTION HANDLE W/BULBOUS TIP: Brand: CARDINAL HEALTH

## (undated) DEVICE — SOL ISO/ALC RUB 70PCT 4OZ

## (undated) DEVICE — DECANT BG O JET

## (undated) DEVICE — ADHS SKIN DERMABOND TOP ADVANCED

## (undated) DEVICE — SOL IRR NACL 0.9PCT 3000ML

## (undated) DEVICE — TUBING, SUCTION, 1/4" X 20', STRAIGHT: Brand: MEDLINE INDUSTRIES, INC.

## (undated) DEVICE — MAT FLR ABSORBENT LG 4FT 10 2.5FT

## (undated) DEVICE — ZIPPERED TOGA, 2X LARGE: Brand: FLYTE, SURGICOOL

## (undated) DEVICE — SUT VIC 0 CT1 36IN J946H

## (undated) DEVICE — ANTIBACTERIAL UNDYED BRAIDED (POLYGLACTIN 910), SYNTHETIC ABSORBABLE SUTURE: Brand: COATED VICRYL

## (undated) DEVICE — PK KN TOTL 40

## (undated) DEVICE — UNDERCAST PADDING: Brand: DEROYAL

## (undated) DEVICE — SUT ETHIB 2 CV V37 MS/4 30IN MX69G

## (undated) DEVICE — GLV SURG SENSICARE W/ALOE PF LF 8 STRL

## (undated) DEVICE — STRAP STIRUP WO/ RNG

## (undated) DEVICE — SCRW HEX PERSONA FML 2.5X25MM PK/2
Type: IMPLANTABLE DEVICE | Site: KNEE | Status: NON-FUNCTIONAL
Removed: 2019-03-05

## (undated) DEVICE — DRSNG WND GEL FIBR OPTICELL AG PLS W/SLV LF 4X5IN  STRL

## (undated) DEVICE — POOLE SUCTION HANDLE: Brand: CARDINAL HEALTH

## (undated) DEVICE — BNDG ELAS ELITE V/CLOSE 4IN 5YD LF STRL

## (undated) DEVICE — OPTIFOAM GENTLE SA, POSTOP, 4X12: Brand: MEDLINE

## (undated) DEVICE — PIN DRL NOHEAD TROC 3.2X75MM BX/4

## (undated) DEVICE — ENCORE® LATEX ORTHO SIZE 8, STERILE LATEX POWDER-FREE SURGICAL GLOVE: Brand: ENCORE

## (undated) DEVICE — DUAL CUT SAGITTAL BLADE

## (undated) DEVICE — BNDG ELAS ELITE V/CLOSE 6IN 5YD LF STRL

## (undated) DEVICE — NDL SPINE 20G 3 1/2 YEL STRL 1P/U

## (undated) DEVICE — STPLR SKIN VISISTAT WD 35CT

## (undated) DEVICE — APPL CHLORAPREP W/TINT 26ML ORNG

## (undated) DEVICE — 3M™ IOBAN™ 2 ANTIMICROBIAL INCISE DRAPE 6640EZ: Brand: IOBAN™ 2